# Patient Record
Sex: FEMALE | Race: WHITE | NOT HISPANIC OR LATINO | URBAN - METROPOLITAN AREA
[De-identification: names, ages, dates, MRNs, and addresses within clinical notes are randomized per-mention and may not be internally consistent; named-entity substitution may affect disease eponyms.]

---

## 2018-01-04 ENCOUNTER — OFFICE VISIT - HEALTHEAST (OUTPATIENT)
Dept: ADDICTION MEDICINE | Facility: CLINIC | Age: 28
End: 2018-01-04

## 2018-01-04 DIAGNOSIS — F10.20 SEVERE ALCOHOL USE DISORDER (H): ICD-10-CM

## 2018-01-15 ENCOUNTER — COMMUNICATION - HEALTHEAST (OUTPATIENT)
Dept: ADDICTION MEDICINE | Facility: CLINIC | Age: 28
End: 2018-01-15

## 2021-06-15 NOTE — PROGRESS NOTES
[facility] Updated Chemical Health Assessment    Date: 2018   : Cary Saldaña      Name: Maren Garcia        Address: [address]  [place] [address]  : 1990  Age: 27 y.o.   Race: White or    Marital Status:single  Phone:  Cellphone: [phone]  #: [id number]  Referral Source: [facility]      Recommendations: Client continues to meet criteria for Alcohol Use Disorder-Severe (F10.20)  Service Requested: Residential MICD treatment to address substance abuse and mental health concerns  Employment: Unemployed  Health Insurance: [facility]      Dimension I Acute intoxication/Withdrawal potential    Symptomology (past 12 months):   Increased tolerance, passing out, binges, weekly intoxication, blackouts, secretive use, preoccupation, medicinal use, using alone, repeated family or social problems, mood swings, loss of control.    Observed or reported (withdrawal symptoms):   Client reports none at this time, no signs or symptoms of intoxication or withdrawal are observed.    Drug Last Use Amount Frequency Route   Alcohol 2017 Mostly beer, also whiskey. Between 4-8 drinks per episode on avg.  Some binge episodes. 1X/every 2 weeks for about last 3 months.  Before that, every other day on avg. oral   Marijuana 2016   smoke   Cocaine/Crack 2016   snort   Opiates/Herion 2010 (Vicodin)   oral   Hallucinogens 2013 (Mushrooms, MDMA, Lyndsey, LSD)   oral   Sedatives/Benzos       Amphetamine/Meth 2010 (Adderall)      Inhalants       Other-Nicotine 2018 Most days-2 cigs/daily  smoke     Dimension I Risk Rating :  0    Reason Risk Rating Assigned: Client reports that she has reduced her alcohol intake in terms of frequency, now drinking 1X/every 2 weeks on average due lack of access (no money, no transportation).  She's been unable to quit drinking completely despite her desire to do that.  When she does drink, she reports some binge  episodes, but no significant WD symptoms, no current symptoms reported or observed.  No use of any substances other than alcohol and nicotine since original Rule 25 eval on 9/15/2017.  Last date of use-alcohol-12/30/2017.      Dimension II Biomedical Conditions    Any known health conditions: No,   Is use related to health problem/concern: NA  PCP:  Dr Swapna Flores, [facility], had annual physical 3 weeks ago.  MD documents physical deterioration due to use: Doctor is aware of alcohol issues, referred me to a [facility] MH provider.  List Health Concerns/Conditions: None. Med is antidepressant only- just started sertraline 3 weeks ago, haven't noticed any differences yet (had been taking Prozac and gabapentin)    Dimension II Risk Rating :  0    Reason Risk Rating Assigned: Client reports no current or chronic physical health conditions. She ompleted an annual physical 3 weeks ago w/ her long-time PCP - able to access care as needed.                  Dimension III Emotional/Behavioral/Cognitive    Oriented to: person, place, time and situation  Current Mental Health Services: No current therapist, [facility]  Hospitalization for MH or psychiatric problems: No  How many Hospitalizations: 0  Last Hospitalization; date and location:NA  MH Diagnoses: depression, anxiety, OCD.  I had some personality testing done a few months ago and was on brink of being diagnosed w/ Borderline PD.  ([facility], [place]).  Didn't follow up because thought I was going into MICD treatment  Psychiatrist: Not at this time      Clinic: NA  Current Medications: Sertraline  Taking medications as prescribed: Yes     Medications Helpful: Not known yet  Current Suicide ideation: No  If yes, any plan? What is plan?: NA  Previous Suicide Attempts? (explain): Yes: Took a bunch of pills one time, 4 years ago.  BF at the time knew - nothing happened, just woke up, went to work, never talked about  it again. Client denies any current or recent SI/intent/plans/actions taken  Made Referral to  Center: No    Dimension III Risk Rating :  2    Reason Risk Rating Assigned: Client reports ongoing diagnoses of anxiety, depression, OCD.  She had some testing done in 2017 that indicated Borderline PD.  No current therapy (she's been holding off on that because she'd planned on being in MICD treatment sooner.)  Her PCP changed her MH med to sertraline 3 weeks ago (from Prozac and gabapentin), Client has not noticed any differences yet.  She reports one previous suicide attempt 4 years ago (taking many pills) - when she woke up the next AM, she carried on and didn't address the issue again.  She reports no current or recent SI/intent/plan/ actions taken.                    Dimension IV Readiness to Change    Mandated, or coerced into assessment or treatment:  Yes, a big reason for moving home to [place] was to try and get my life back on track, this is part of doing that.  Does client feel there is a problem:  Yes  Verbalization of need/desire to change: Yes  Impression of :   Cooperative, genuinely motivated.    Dimension IV Risk Rating :  0    Reason Risk Rating Assigned: Client states she is seeking treatment because she wants to be completely sober, get her life back on track.  Client reports she has been seeking treatment since September, attributes that not happening yet to the failures of others to do what should have been done.  Client reports no mandates exist for treatment completion.                  Dimension V Relapse/Continued Use/Continued Problem Potential    Lifetime # of CD Treatments: 0  List program, dates, and status of completion: NA    Dimension V Risk Ratin    Reason Risk Rating Assigned: Client reports she's reduced her alcohol consumption due to lack of opportunity; she drinks when she can and hasn't been able to achieve 100% sobriety despite her desire for it.  She appears to have no  coping skills to respond to triggers without using when alcohol is available to her.  Client displays some awareness of possible impacts of drinking on her MH symptoms and vice versa.  No previous CD treatment experiences.  Last date of use-alcohol-12/30/2017.                  Dimension VI Recovery Environment    Sober Family support:  Yes, living w/parents.  They don't drink, no alcohol in home  Sober friend support: No  Connected to sober support network: No  Current living circumstances: [place] home in [place] - no DL so parents transport  Environment supportive of recovery: Yes  Spiritual/Faith needs: No   Cultural needs: No   Family history of CD/MH issues: Yes: Grandmother (dad's mom) and her sister were hospitalized for MH issues. Sister-anxiety, depression - possibly bipolar?  Taboo subject, we don't really talk about  Family size: Living with 2 parents          Number of children: 0  Current Legal Concerns: No.: Don't have DL because of DUI in 2015 - no current probation, but haven't gotten license back. 1 other DUI-2012  Pregnancy Concerns: No  Mothers First Contacted: N/A  Child Protection Involvement: N/A  CP worker Name: NA       Phone: NA    Dimension VI Risk Rating :  3    Reason Risk Rating Assigned: Client was last employed in March, 2017; then she moved back to [place], has been living w/her parents since.  They don't drink, no alcohol in the home.  Client reports no structured, meaningful daily activities; no connections with sober peers or support groups; no enjoyable leisure activities.  Client has had 2 DUIs, 2012 and 2015.  She lost her DL after the most recent, hasn't gotten it back yet, depends on parents for transportation. No current legal involvements.      Leighton Walker, [facility]  1/4/2018  12:29 PM